# Patient Record
Sex: FEMALE | Employment: FULL TIME | URBAN - METROPOLITAN AREA
[De-identification: names, ages, dates, MRNs, and addresses within clinical notes are randomized per-mention and may not be internally consistent; named-entity substitution may affect disease eponyms.]

---

## 2019-04-30 NOTE — PROGRESS NOTES
"  SUBJECTIVE:                                                   Dora Ramirez is a 33 year old female who presents to clinic today for the following health issue(s):  No chief complaint on file.        HPI:  ***    No LMP recorded..   Patient {is/is not:625487::\"is\"} sexually active, No obstetric history on file..  Using {[facility] CONTRACEPTION:792749} for contraception.    reports that she has never smoked. She does not have any smokeless tobacco history on file.  {Tobacco Cessation -- Delete if patient is a non-smoker:579859}  STD testing offered?  {[facility] GC/CHLAMYDIA:068051}    Health maintenance updated:  {yes no:040269}    Today's PHQ-2 Score: No flowsheet data found.  Today's PHQ-9 Score: No flowsheet data found.  Today's LEONOR-7 Score: No flowsheet data found.    Problem list and histories reviewed & adjusted, as indicated.  Additional history: as documented.    There is no problem list on file for this patient.    No past surgical history on file.   Social History     Tobacco Use     Smoking status: Never Smoker   Substance Use Topics     Alcohol use: No           No current outpatient medications on file.     No current facility-administered medications for this visit.      No Known Allergies    ROS:  {[facility] ROSGYN:429569::\"12 point review of systems negative other than symptoms noted below.\"}    OBJECTIVE:     There were no vitals taken for this visit.  There is no height or weight on file to calculate BMI.    Exam:  {[facility] EXAM CHOICES:208097}     In-Clinic Test Results:  No results found for this or any previous visit (from the past 24 hour(s)).    ASSESSMENT/PLAN:                                                      No diagnosis found.    There are no Patient Instructions on file for this visit.    ***    Evens Hanson MD  [facility]  "

## 2019-05-01 NOTE — PROGRESS NOTES
SUBJECTIVE:                                                   Dora Ramirez is a 33 year old female who presents to clinic today for the following health issue(s):  Patient presents with:  Fertility      HPI:  Current getting treatment by [facility] for infertility.   Pt trying for 4 years. Started at [facility]. Received IUI without medications for 4 cycles. Then referred to [facility]. Had low volume SA and he has seen urology. AMH in 2017 was 1.6. HSG normal.   At [facility] taking clomid and injectable meds. Also had thyroid optimization. Planning 3 cycles with IUI. If not pregnant, plan IVF.   Pt is tired of the treatments. Wondering if she should go right to IVF.  Otherwise healthy. Sent by Therese Montelongo ([place]) to see me.  Current in luteal phase and neg UPT.    Patient's last menstrual period was 04/06/2019 (exact date)..   Patient is sexually active, No obstetric history on file..  Using none for contraception.    reports that she has never smoked. She has never used smokeless tobacco.    STD testing offered?  Declined    Health maintenance updated:  yes    Today's PHQ-2 Score:   PHQ-2 ( 1999 [employer]) 5/2/2019   Q1: Little interest or pleasure in doing things 0   Q2: Feeling down, depressed or hopeless 0   PHQ-2 Score 0     Today's PHQ-9 Score:   PHQ-9 SCORE 5/2/2019   PHQ-9 Total Score 4     Today's LEONOR-7 Score:   LEONOR-7 SCORE 5/2/2019   Total Score 4       Problem list and histories reviewed & adjusted, as indicated.  Additional history: as documented.    There is no problem list on file for this patient.    History reviewed. No pertinent surgical history.   Social History     Tobacco Use     Smoking status: Never Smoker     Smokeless tobacco: Never Used   Substance Use Topics     Alcohol use: No           Current Outpatient Medications   Medication Sig     Cholecalciferol (VITAMIN D PO)      Prenatal Vit-Fe Fumarate-FA (PRENATAL PO)      No current facility-administered medications for this visit.      No  "Known Allergies    ROS:  12 point review of systems negative other than symptoms noted below.    OBJECTIVE:     /64   Ht 1.727 m (5' 8\")   Wt 65.4 kg (144 lb 3.2 oz)   LMP 04/06/2019 (Exact Date)   Breastfeeding? No   BMI 21.93 kg/m    Body mass index is 21.93 kg/m .    Exam:  Constitutional:  Appearance: Well nourished, well developed alert, in no acute distress  Neurologic/Psychiatric:  Mental Status:  Oriented X3      In-Clinic Test Results:  No results found for this or any previous visit (from the past 24 hour(s)).    ASSESSMENT/PLAN:                                                        ICD-10-CM    1. Female infertility associated with male factors Z31.81     N97.8        Discussed Clomid/IUI regimen and chance of pregnancy. Explained need for multiple cycles to say it failed. Discussed age, AMH and decreased fertility. Encouraged finishing 3rd cycle with [facility] because it would be a lot less expensive than IVF and one month won't make a difference.   Discussed healthy lifestyle.   If misses menses repeat UPT.   Explained AMH. May have more recent one at [facility] but no records available.    30 minutes was spent face to face with the patient today discussing her history, diagnosis, and follow-up plan as noted above. Over 50% of the visit was spent in counseling and coordination of care.    Total Visit Time: 30 minutes.       Evens Hanson MD  [facility]  "

## 2019-05-02 ENCOUNTER — OFFICE VISIT (OUTPATIENT)
Dept: OBGYN | Facility: CLINIC | Age: 34
End: 2019-05-02

## 2019-05-02 VITALS
BODY MASS INDEX: 21.86 KG/M2 | HEIGHT: 68 IN | SYSTOLIC BLOOD PRESSURE: 106 MMHG | WEIGHT: 144.2 LBS | DIASTOLIC BLOOD PRESSURE: 64 MMHG

## 2019-05-02 DIAGNOSIS — N97.8 FEMALE INFERTILITY ASSOCIATED WITH MALE FACTORS: Primary | ICD-10-CM

## 2019-05-02 DIAGNOSIS — Z31.81 FEMALE INFERTILITY ASSOCIATED WITH MALE FACTORS: Primary | ICD-10-CM

## 2019-05-02 PROCEDURE — 99203 OFFICE O/P NEW LOW 30 MIN: CPT | Performed by: OBSTETRICS & GYNECOLOGY

## 2019-05-02 ASSESSMENT — ANXIETY QUESTIONNAIRES
7. FEELING AFRAID AS IF SOMETHING AWFUL MIGHT HAPPEN: NOT AT ALL
1. FEELING NERVOUS, ANXIOUS, OR ON EDGE: MORE THAN HALF THE DAYS
2. NOT BEING ABLE TO STOP OR CONTROL WORRYING: NOT AT ALL
GAD7 TOTAL SCORE: 4
3. WORRYING TOO MUCH ABOUT DIFFERENT THINGS: NOT AT ALL
IF YOU CHECKED OFF ANY PROBLEMS ON THIS QUESTIONNAIRE, HOW DIFFICULT HAVE THESE PROBLEMS MADE IT FOR YOU TO DO YOUR WORK, TAKE CARE OF THINGS AT HOME, OR GET ALONG WITH OTHER PEOPLE: SOMEWHAT DIFFICULT
5. BEING SO RESTLESS THAT IT IS HARD TO SIT STILL: NOT AT ALL
6. BECOMING EASILY ANNOYED OR IRRITABLE: MORE THAN HALF THE DAYS

## 2019-05-02 ASSESSMENT — PATIENT HEALTH QUESTIONNAIRE - PHQ9
5. POOR APPETITE OR OVEREATING: NOT AT ALL
SUM OF ALL RESPONSES TO PHQ QUESTIONS 1-9: 4

## 2019-05-02 ASSESSMENT — MIFFLIN-ST. JEOR: SCORE: 1397.59

## 2019-05-03 ASSESSMENT — ANXIETY QUESTIONNAIRES: GAD7 TOTAL SCORE: 4

## 2019-05-16 ENCOUNTER — HEALTH MAINTENANCE LETTER (OUTPATIENT)
Age: 34
End: 2019-05-16

## 2019-09-12 ENCOUNTER — TELEPHONE (OUTPATIENT)
Dept: OBGYN | Facility: CLINIC | Age: 34
End: 2019-09-12

## 2019-09-12 NOTE — TELEPHONE ENCOUNTER
Please abstract the following data from this visit with this patient into the appropriate field in Epic:    Tests that can be patient reported without a hard copy:    Pap smear done on this date: 12/15/17 (approximately), by this group: [facility], results were negative. HPV negative. Due in Dec 2022.      Note to [employer]: results were found via Care Everywhere.

## 2020-03-02 ENCOUNTER — HEALTH MAINTENANCE LETTER (OUTPATIENT)
Age: 35
End: 2020-03-02

## 2020-12-20 ENCOUNTER — HEALTH MAINTENANCE LETTER (OUTPATIENT)
Age: 35
End: 2020-12-20

## 2021-04-24 ENCOUNTER — HEALTH MAINTENANCE LETTER (OUTPATIENT)
Age: 36
End: 2021-04-24

## 2021-10-03 ENCOUNTER — HEALTH MAINTENANCE LETTER (OUTPATIENT)
Age: 36
End: 2021-10-03

## 2022-05-15 ENCOUNTER — HEALTH MAINTENANCE LETTER (OUTPATIENT)
Age: 37
End: 2022-05-15

## 2022-09-10 ENCOUNTER — HEALTH MAINTENANCE LETTER (OUTPATIENT)
Age: 37
End: 2022-09-10

## 2023-06-03 ENCOUNTER — HEALTH MAINTENANCE LETTER (OUTPATIENT)
Age: 38
End: 2023-06-03

## 2024-02-18 ENCOUNTER — HEALTH MAINTENANCE LETTER (OUTPATIENT)
Age: 39
End: 2024-02-18